# Patient Record
Sex: FEMALE | Race: WHITE | NOT HISPANIC OR LATINO | Employment: OTHER | URBAN - METROPOLITAN AREA
[De-identification: names, ages, dates, MRNs, and addresses within clinical notes are randomized per-mention and may not be internally consistent; named-entity substitution may affect disease eponyms.]

---

## 2023-02-21 ENCOUNTER — OFFICE VISIT (OUTPATIENT)
Dept: OBGYN CLINIC | Facility: CLINIC | Age: 58
End: 2023-02-21

## 2023-02-21 VITALS
WEIGHT: 151.4 LBS | DIASTOLIC BLOOD PRESSURE: 60 MMHG | HEART RATE: 73 BPM | BODY MASS INDEX: 23.76 KG/M2 | SYSTOLIC BLOOD PRESSURE: 97 MMHG | HEIGHT: 67 IN

## 2023-02-21 DIAGNOSIS — M17.11 PRIMARY OSTEOARTHRITIS OF RIGHT KNEE: Primary | ICD-10-CM

## 2023-02-21 DIAGNOSIS — M17.12 PRIMARY OSTEOARTHRITIS OF LEFT KNEE: ICD-10-CM

## 2023-02-21 RX ORDER — DEXAMETHASONE SODIUM PHOSPHATE 10 MG/ML
40 INJECTION, SOLUTION INTRAMUSCULAR; INTRAVENOUS
Status: COMPLETED | OUTPATIENT
Start: 2023-02-21 | End: 2023-02-21

## 2023-02-21 RX ADMIN — DEXAMETHASONE SODIUM PHOSPHATE 40 MG: 10 INJECTION, SOLUTION INTRAMUSCULAR; INTRAVENOUS at 15:53

## 2023-02-21 NOTE — PROGRESS NOTES
Assessment/Plan:  1  Primary osteoarthritis of right knee  Large joint arthrocentesis: R knee      2  Primary osteoarthritis of left knee          Scribe Attestation    I,:  Theoplis Knee am acting as a scribe while in the presence of the attending physician :       I,:  Ozzie Ellis MD personally performed the services described in this documentation    as scribed in my presence :         Evelyn Lock upon exam demonstrates signs and symptoms of bilateral knee osteoarthritis  I reviewed images the patient provided on her phone which do display severe arthritic changes in the right knee and moderate changes in the left knee  Clinically, her exam is consistent with arthritis as she has crepitus with knee flexion and extension  She does have a flexion contracture in the right knee although she is stable in all planes  I had a lengthy discussion with her regarding treatment options in the form of ice, heat, oral analgesics or physical therapy  I also discussed the options of steroid versus viscosupplementation  I explained to her the only surgical option for her condition is a total knee replacement  I would not recommend surgery based on her overall good functional ability  She elected to receive a steroid injection today for the right knee as this is more symptomatic  Risks and benefits of the procedure were discussed  She tolerated the injection well without complications  If she fails to find symptomatic relief or her symptoms worsen, she may return to the office for other treatment options such as visco injections  Evelyn Lock verbalized understanding and had no further questions to address today  She may follow up as needed  Large joint arthrocentesis: R knee  Universal Protocol:  Consent: Verbal consent obtained    Risks and benefits: risks, benefits and alternatives were discussed  Consent given by: patient  Time out: Immediately prior to procedure a "time out" was called to verify the correct patient, procedure, equipment, support staff and site/side marked as required  Timeout called at: 2/21/2023 3:51 PM   Patient understanding: patient states understanding of the procedure being performed  Site marked: the operative site was marked  Patient identity confirmed: verbally with patient    Supporting Documentation  Indications: pain   Procedure Details  Location: knee - R knee  Preparation: Patient was prepped and draped in the usual sterile fashion  Needle size: 22 G  Ultrasound guidance: no  Approach: anterolateral  Medications administered: 40 mg dexamethasone 100 mg/10 mL (4 mL bupivacaine HCL 0 5%)    Patient tolerance: patient tolerated the procedure well with no immediate complications  Dressing:  Sterile dressing applied        Subjective:   Jeannette Farooq is a 63 y o  female who presents for initial evaluation of her bilateral knees  She states she has had difficulty with her physical activity and decrease in motion recently  She visited with a previous provider for xrays and was told she had degenerative cartilage disease  She is here today to discuss various options for treatment  Meryl Hernandez denies having pain in either knee, but rather complains of stiffness and the inability to move as well as she would like to  She cannot kneel on the knees in child's pose without significant limitations  She feels significant fatigue at the end of the day  She is on her feet constantly for work  She has seen other providers in [place] for her knees before  Review of Systems   Constitutional: Negative for chills, fever and unexpected weight change  HENT: Negative for nosebleeds and sore throat  Eyes: Negative for pain, redness and visual disturbance  Respiratory: Negative for cough, shortness of breath and wheezing  Cardiovascular: Negative for chest pain, palpitations and leg swelling  Gastrointestinal: Negative for nausea and vomiting  Endocrine: Negative for polydipsia and polyuria     Genitourinary: Negative for dysuria and hematuria  Musculoskeletal: Positive for arthralgias  See HPI    Skin: Negative for rash and wound  Neurological: Negative for dizziness, numbness and headaches  Psychiatric/Behavioral: Negative for decreased concentration  The patient is not nervous/anxious  History reviewed  No pertinent past medical history  History reviewed  No pertinent surgical history  Family History   Problem Relation Age of Onset   • Hypertension Father        Social History     Occupational History   • Not on file   Tobacco Use   • Smoking status: Former     Types: Cigarettes   • Smokeless tobacco: Never   Vaping Use   • Vaping Use: Never used   Substance and Sexual Activity   • Alcohol use: Yes   • Drug use: Never   • Sexual activity: Not on file       No current outpatient medications on file  No Known Allergies    Objective:  Vitals:    02/21/23 1503   BP: 97/60   Pulse: 73       Right Knee Exam     Tenderness   The patient is experiencing tenderness in the medial joint line  Range of Motion   Extension: -5   Flexion: 110     Tests   Varus: negative Valgus: negative  Drawer:  Anterior - negative    Posterior - negative    Other   Pulse: present  Swelling: none  Effusion: no effusion present    Comments:  Flexion contracture   Crepitus       Left Knee Exam     Range of Motion   Extension: 0   Flexion: 110     Tests   Varus: negative Valgus: negative  Drawer:  Anterior - negative     Posterior - negative    Comments:  Crepitus           Observations     Right Knee   Negative for effusion  Physical Exam  Constitutional:       Appearance: She is well-developed  HENT:      Head: Normocephalic and atraumatic  Eyes:      Conjunctiva/sclera: Conjunctivae normal    Pulmonary:      Effort: Pulmonary effort is normal  No respiratory distress  Musculoskeletal:      Right knee: No effusion  Skin:     General: Skin is warm and dry     Neurological:      Mental Status: She is alert and oriented to person, place, and time  Psychiatric:         Behavior: Behavior normal          I have personally reviewed pertinent films in PACS and my interpretation is as follows:  Images were on the patient's phone, not PACS  Bilateral knee images demonstrate severe patellofemoral and lateral compartment osteoarthritis of the right knee and moderate patellofemoral and lateral compartment osteoarthritis of the left knee  Various osteophytes are appreciated  This document was created using speech voice recognition software  Grammatical errors, random word insertions, pronoun errors, and incomplete sentences are an occasional consequence of this system due to software limitations, ambient noise, and hardware issues  Any formal questions or concerns about content, text, or information contained within the body of this dictation should be directly addressed to the provider for clarification